# Patient Record
Sex: MALE | Race: BLACK OR AFRICAN AMERICAN | NOT HISPANIC OR LATINO | Employment: UNEMPLOYED | URBAN - NONMETROPOLITAN AREA
[De-identification: names, ages, dates, MRNs, and addresses within clinical notes are randomized per-mention and may not be internally consistent; named-entity substitution may affect disease eponyms.]

---

## 2023-06-15 ENCOUNTER — OFFICE VISIT (OUTPATIENT)
Dept: FAMILY MEDICINE | Facility: CLINIC | Age: 58
End: 2023-06-15
Payer: COMMERCIAL

## 2023-06-15 ENCOUNTER — HOSPITAL ENCOUNTER (EMERGENCY)
Facility: HOSPITAL | Age: 58
Discharge: HOME | End: 2023-06-15
Attending: EMERGENCY MEDICINE
Payer: COMMERCIAL

## 2023-06-15 VITALS
SYSTOLIC BLOOD PRESSURE: 128 MMHG | WEIGHT: 261.13 LBS | BODY MASS INDEX: 32.47 KG/M2 | OXYGEN SATURATION: 100 % | DIASTOLIC BLOOD PRESSURE: 90 MMHG | TEMPERATURE: 98 F | HEIGHT: 75 IN | HEART RATE: 72 BPM

## 2023-06-15 VITALS
SYSTOLIC BLOOD PRESSURE: 121 MMHG | DIASTOLIC BLOOD PRESSURE: 73 MMHG | HEIGHT: 75 IN | RESPIRATION RATE: 19 BRPM | WEIGHT: 260 LBS | BODY MASS INDEX: 32.33 KG/M2 | TEMPERATURE: 98 F | OXYGEN SATURATION: 99 % | HEART RATE: 65 BPM

## 2023-06-15 DIAGNOSIS — I10 HYPERTENSION, UNSPECIFIED TYPE: Primary | ICD-10-CM

## 2023-06-15 DIAGNOSIS — G51.0 BELL'S PALSY: Primary | ICD-10-CM

## 2023-06-15 DIAGNOSIS — Q67.0 FACIAL ASYMMETRY: ICD-10-CM

## 2023-06-15 LAB — GLUCOSE SERPL-MCNC: 114 MG/DL (ref 70–105)

## 2023-06-15 PROCEDURE — 3074F PR MOST RECENT SYSTOLIC BLOOD PRESSURE < 130 MM HG: ICD-10-PCS | Mod: CPTII,,, | Performed by: STUDENT IN AN ORGANIZED HEALTH CARE EDUCATION/TRAINING PROGRAM

## 2023-06-15 PROCEDURE — 3008F BODY MASS INDEX DOCD: CPT | Mod: CPTII,,, | Performed by: STUDENT IN AN ORGANIZED HEALTH CARE EDUCATION/TRAINING PROGRAM

## 2023-06-15 PROCEDURE — 3080F PR MOST RECENT DIASTOLIC BLOOD PRESSURE >= 90 MM HG: ICD-10-PCS | Mod: CPTII,,, | Performed by: STUDENT IN AN ORGANIZED HEALTH CARE EDUCATION/TRAINING PROGRAM

## 2023-06-15 PROCEDURE — 99284 EMERGENCY DEPT VISIT MOD MDM: CPT | Mod: 25

## 2023-06-15 PROCEDURE — 99204 OFFICE O/P NEW MOD 45 MIN: CPT | Mod: ,,, | Performed by: STUDENT IN AN ORGANIZED HEALTH CARE EDUCATION/TRAINING PROGRAM

## 2023-06-15 PROCEDURE — 99284 EMERGENCY DEPT VISIT MOD MDM: CPT | Mod: ,,, | Performed by: EMERGENCY MEDICINE

## 2023-06-15 PROCEDURE — 1159F MED LIST DOCD IN RCRD: CPT | Mod: CPTII,,, | Performed by: STUDENT IN AN ORGANIZED HEALTH CARE EDUCATION/TRAINING PROGRAM

## 2023-06-15 PROCEDURE — 82962 GLUCOSE BLOOD TEST: CPT

## 2023-06-15 PROCEDURE — 3008F PR BODY MASS INDEX (BMI) DOCUMENTED: ICD-10-PCS | Mod: CPTII,,, | Performed by: STUDENT IN AN ORGANIZED HEALTH CARE EDUCATION/TRAINING PROGRAM

## 2023-06-15 PROCEDURE — 3074F SYST BP LT 130 MM HG: CPT | Mod: CPTII,,, | Performed by: STUDENT IN AN ORGANIZED HEALTH CARE EDUCATION/TRAINING PROGRAM

## 2023-06-15 PROCEDURE — 3080F DIAST BP >= 90 MM HG: CPT | Mod: CPTII,,, | Performed by: STUDENT IN AN ORGANIZED HEALTH CARE EDUCATION/TRAINING PROGRAM

## 2023-06-15 PROCEDURE — 99204 PR OFFICE/OUTPT VISIT, NEW, LEVL IV, 45-59 MIN: ICD-10-PCS | Mod: ,,, | Performed by: STUDENT IN AN ORGANIZED HEALTH CARE EDUCATION/TRAINING PROGRAM

## 2023-06-15 PROCEDURE — 1159F PR MEDICATION LIST DOCUMENTED IN MEDICAL RECORD: ICD-10-PCS | Mod: CPTII,,, | Performed by: STUDENT IN AN ORGANIZED HEALTH CARE EDUCATION/TRAINING PROGRAM

## 2023-06-15 PROCEDURE — 99284 PR EMERGENCY DEPT VISIT,LEVEL IV: ICD-10-PCS | Mod: ,,, | Performed by: EMERGENCY MEDICINE

## 2023-06-15 RX ORDER — PREDNISONE 20 MG/1
40 TABLET ORAL DAILY
Qty: 10 TABLET | Refills: 0 | Status: SHIPPED | OUTPATIENT
Start: 2023-06-15 | End: 2023-06-20

## 2023-06-15 RX ORDER — TADALAFIL 5 MG/1
5 TABLET ORAL
COMMUNITY
Start: 2023-02-01 | End: 2023-08-28 | Stop reason: SDUPTHER

## 2023-06-15 RX ORDER — ACYCLOVIR 800 MG/1
800 TABLET ORAL
Qty: 40 TABLET | Refills: 0 | Status: SHIPPED | OUTPATIENT
Start: 2023-06-15 | End: 2023-07-17 | Stop reason: ALTCHOICE

## 2023-06-15 RX ORDER — AMLODIPINE BESYLATE 10 MG/1
10 TABLET ORAL DAILY
COMMUNITY
End: 2023-08-29 | Stop reason: SDUPTHER

## 2023-06-15 NOTE — PROGRESS NOTES
[facility]    Chief Complaint      Chief Complaint   Patient presents with    Establish Care     Pt came in to est care, but had to be evaluated for possible stroke measures         History of Present Illness      Val Henry is a 58 y.o. male  initially in  to clinic to establish care, however, states wants to be evaluated for possible stroke.  History of HLD, HTN, Bell's Palsy, ED.  States this particular episode is different from his normal Bell's palsy flare up has facial drooping, states blood pressure was suddenly elevated during event, slurred speech.  Took baby asa.    Referring to ED for urgent work up, as pt is adamant that this episode is very different from his normal [place] Palsy and is concerned.   Declines need for ambulance transport and states feels stable to drive.    Past Medical History:  Past Medical History:   Diagnosis Date    Arthritis     Diabetes mellitus type I     Disorder of kidney and ureter     GERD (gastroesophageal reflux disease)     Hyperlipidemia     Hypertension        Past Surgical History:   has no past surgical history on file.    Social History:  Social History     Tobacco Use    Smoking status: Never    Smokeless tobacco: Never   Substance Use Topics    Alcohol use: Not Currently    Drug use: Never       I personally reviewed all past medical, surgical, and social.     Review of Systems   Constitutional:  Negative for chills and fever.   Eyes:  Positive for blurred vision.   Respiratory:  Negative for shortness of breath.    Cardiovascular:  Negative for chest pain.   Neurological:  Positive for tingling, speech change and headaches. Negative for focal weakness, seizures and loss of consciousness.        Facial asymmetry       Medications:  Outpatient Encounter Medications as of 6/15/2023   Medication Sig Dispense Refill    amLODIPine (NORVASC) 10 MG tablet Take 10 mg by mouth once daily.      tadalafiL (CIALIS) 5 MG tablet Take 5 mg by mouth.       No  "facility-administered encounter medications on file as of 6/15/2023.       Allergies:  Review of patient's allergies indicates:  No Known Allergies    Health Maintenance:    There is no immunization history on file for this patient.   Health Maintenance   Topic Date Due    Hepatitis C Screening  Never done    TETANUS VACCINE  Never done    Lipid Panel  01/31/2027        Physical Exam      Vital Signs  Temp: 98.1 °F (36.7 °C)  Temp Source: Oral  Pulse: 72  SpO2: 100 %  BP: (!) 128/90  BP Location: Left arm  Patient Position: Sitting  Height and Weight  Height: 6' 3" (190.5 cm)  Weight: 118.4 kg (261 lb 1.6 oz)  BSA (Calculated - sq m): 2.5 sq meters  BMI (Calculated): 32.6  Weight in (lb) to have BMI = 25: 199.6]    Physical Exam  Constitutional:       Appearance: Normal appearance.   Cardiovascular:      Rate and Rhythm: Normal rate.   Pulmonary:      Effort: Pulmonary effort is normal.      Breath sounds: Normal breath sounds.   Skin:     General: Skin is warm and dry.   Neurological:      Mental Status: He is alert and oriented to person, place, and time.      Motor: No weakness.      Gait: Gait normal.        Laboratory:  CBC:      CMP:        Invalid input(s): CREATININ  LIPIDS:      TSH:      A1C:        Assessment/Plan     Val Henry is a 58 y.o.male with:    1. Hypertension, unspecified type  -continue current medications  2. Facial asymmetry   -likely due to Bell's Palsy, however, pt requesting to be evaluated for stroke and was referred to ED     Chronic conditions status updated as per HPI.  Other than changes above, cont current medications and maintain follow up with specialists.  Return to clinic in as needed/scheduled.     There are no Patient Instructions on file for this visit.     Mague Palacios, [facility]                  "

## 2023-06-15 NOTE — DISCHARGE INSTRUCTIONS
Follow-up with primary care and with neurologist.  In ambulatory referral was made to neurologist    Make sure to take your medications regularly.  Also resume your medication for cholesterol.    Your primary care doctor may decide to order more tests such as a more recent lipid panel.  Also talk with neurologist about any other needed test    Take aspirin 81 mg daily    Will start prednisone for Bell's palsy.  Adhere to diabetic diet.

## 2023-06-15 NOTE — ED PROVIDER NOTES
Encounter Date: 6/15/2023    SCRIBE #1 NOTE: I, Raissa Damico, am scribing for, and in the presence of,  Gio Lira MD. I have scribed the entire note.     History     Chief Complaint   Patient presents with    Facial Droop     Left side. Hx bells palsy    Numbness     Left sided facial numbness x 2 days     This is a 59 y/o black male,who presents to the ED for evaluation. He states over the past 2 days, he has noticed left sided facial droop. He reports he has had Bell's Palsy in the past. He notes he started to feel numbness in the left sided fingertips. He reports slight slurred speech as well as well slight blurred vision.Pt reports cold like Sx associated with the facial droop.  There are no other complaints/pain in the ED at this time. He has a known hx of diabetes, GERD, HTN, and hyperlipidemia. There is no family medical hx of stroke to his knowledge. There is no smoking Hx.     The history is provided by the patient. No  was used.   Review of patient's allergies indicates:  No Known Allergies  Past Medical History:   Diagnosis Date    Arthritis     Diabetes mellitus type I     Disorder of kidney and ureter     GERD (gastroesophageal reflux disease)     Hyperlipidemia     Hypertension      History reviewed. No pertinent surgical history.  History reviewed. No pertinent family history.  Social History     Tobacco Use    Smoking status: Never    Smokeless tobacco: Never   Substance Use Topics    Alcohol use: Not Currently    Drug use: Never     Review of Systems   Eyes:  Positive for visual disturbance.   Neurological:  Positive for speech difficulty, weakness and numbness.   All other systems reviewed and are negative.    Physical Exam     Initial Vitals [06/15/23 0955]   BP Pulse Resp Temp SpO2   135/82 91 17 98.4 °F (36.9 °C) 97 %      MAP       --         Physical Exam    Nursing note and vitals reviewed.  Constitutional: He appears well-developed and well-nourished.    HENT:   Head: Normocephalic and atraumatic.   Eyes: EOM are normal. Pupils are equal, round, and reactive to light.   Neck: Neck supple. No thyromegaly present. No JVD present.   Normal range of motion.  Cardiovascular:  Normal rate, regular rhythm, normal heart sounds and intact distal pulses.           No murmur heard.  Pulmonary/Chest: Breath sounds normal. No stridor. No respiratory distress. He has no wheezes.   Abdominal: Abdomen is soft. Bowel sounds are normal. He exhibits no distension. There is no abdominal tenderness.   Musculoskeletal:         General: No tenderness or edema. Normal range of motion.      Cervical back: Normal range of motion and neck supple.     Lymphadenopathy:     He has no cervical adenopathy.   Neurological: He is alert and oriented to person, place, and time. He has normal strength. No cranial nerve deficit or sensory deficit. GCS score is 15. GCS eye subscore is 4. GCS verbal subscore is 5. GCS motor subscore is 6.   Pt has slight upper and lower facial droop.      Skin: Skin is warm and dry. Capillary refill takes less than 2 seconds. No rash noted.   Psychiatric: He has a normal mood and affect.       ED Course   Procedures  Labs Reviewed   POCT GLUCOSE MONITORING CONTINUOUS - Abnormal; Notable for the following components:       Result Value    POC Glucose 114 (*)     All other components within normal limits          Imaging Results              CT Head Without Contrast (Final result)  Result time 06/15/23 10:44:36      Final result by Rosalio Gutierrez MD (06/15/23 10:44:36)                   Impression:      No acute intracranial abnormality.      Electronically signed by: Rosalio Gutierrez  Date:    06/15/2023  Time:    10:44               Narrative:    EXAMINATION:  CT HEAD WITHOUT CONTRAST    CLINICAL HISTORY:  Neuro deficit, acute, stroke suspected;    TECHNIQUE:  CT of the head performed without the use of intravenous contrast.  The CT examination was performed using one or more  of the following dose reduction techniques: Automated exposure control, adjustment of the mA and kV according to patient's size, use of acute or iterative reconstruction techniques.    COMPARISON:  None.    FINDINGS:  No acute intracranial hemorrhage, mass, infarct, or fluid collection.  Ventricles, sulci, and cisterns appear normal.  No mass effect or midline shift.  Calvarium intact.  Mastoid air cells and paranasal sinuses clear.                                    X-Rays:   Independently Interpreted Readings:   Head CT: CT Head without contrast:   No acute intracranial abnormality.   Medications - No data to display  Medical Decision Making:   ED Management:  MDM    Patient presents for emergent evaluation of acute left face weakness that poses a threat to life and/or bodily function.    In the ED patient found to have acute Bell's palsy.    I ordered CT scan and personally reviewed it and reviewed the radiologist interpretation.  CT significant for no acute abnormality.      Discharge MDM  Do not suspect acute stroke limited to motor of the face.  No hand or leg weakness.  NIH stroke score only positive for face involvement.  Upper and lower face involved.  Will treat with acyclovir and prednisone.  Will have him follow-up with Neurology and primary care  The response to treatment was stable.    Patient was discharged in stable condition.  Detailed return precautions discussed.   Additional MDM:     NIH Stroke Scale:   Level of consciousness = 0 - alert  LOC questions = 0 - answers both correctly  LOC commands = 0 - performs both correctly  Best gaze = 0 - normal  Visual = 0 - no visual loss  Facial palsy = 1 - minor  Motor left arm =  0 - no drift  Motor right arm =  0 - no drift  Motor left leg = 0 - no drift  Motor right leg =  0 - no drift  Limb ataxia = 0 - absent  Sensory = 0 - normal  Best language = 0 - no aphasia  Dysarthria = 0 - normal articulation  Extinction and inattention = 0 - no neglect  NIH  Stroke Scale Total = 1       Attending Attestation:           Physician Attestation for Scribe:  Physician Attestation Statement for Scribe #1: I, Gio Lira MD, reviewed documentation, as scribed by Raissa Damico in my presence, and it is both accurate and complete.           ED Course as of 06/15/23 1316   Thu Otis 15, 2023   1029 Consistent with recurrent Bell's palsy.  No arm or leg involvement.  NIH stroke score is completely normal except for some upper and lower face droop on the left.  He is also had recent URI.  Patient has had diabetes and dyslipidemia.  Elected not to have blood work done today but to defer to outpatient/primary care.  Will have ambulatory referral to Neurology.  Advised him to start taking his cholesterol medication.  Has done well prednisone previously with Bell's palsy. [PK]   1048 CT Head without contrast:   No acute intracranial abnormality. [BW]      ED Course User Index  [BW] Raissa Damico  [PK] Gio Lira MD                   Clinical Impression:   Final diagnoses:  [G51.0] Bell's palsy (Primary)        ED Disposition Condition    Discharge Stable          ED Prescriptions       Medication Sig Dispense Start Date End Date Auth. Provider    predniSONE (DELTASONE) 20 MG tablet Take 2 tablets (40 mg total) by mouth once daily. for 5 days 10 tablet 6/15/2023 6/20/2023 Gio Lira MD    acyclovir (ZOVIRAX) 800 MG Tab Take 1 tablet (800 mg total) by mouth 5 (five) times daily. 40 tablet 6/15/2023 6/14/2024 Gio Lira MD          Follow-up Information    None          Gio Lira MD  06/15/23 1199

## 2023-06-17 ENCOUNTER — TELEPHONE (OUTPATIENT)
Dept: EMERGENCY MEDICINE | Facility: HOSPITAL | Age: 58
End: 2023-06-17
Payer: COMMERCIAL

## 2023-07-17 ENCOUNTER — OFFICE VISIT (OUTPATIENT)
Dept: FAMILY MEDICINE | Facility: CLINIC | Age: 58
End: 2023-07-17
Payer: COMMERCIAL

## 2023-07-17 VITALS
SYSTOLIC BLOOD PRESSURE: 148 MMHG | BODY MASS INDEX: 32.6 KG/M2 | DIASTOLIC BLOOD PRESSURE: 82 MMHG | HEART RATE: 85 BPM | HEIGHT: 75 IN | TEMPERATURE: 98 F | WEIGHT: 262.19 LBS | OXYGEN SATURATION: 97 %

## 2023-07-17 DIAGNOSIS — I10 HYPERTENSION, UNSPECIFIED TYPE: Primary | ICD-10-CM

## 2023-07-17 DIAGNOSIS — Z12.11 SCREENING FOR COLON CANCER: Primary | ICD-10-CM

## 2023-07-17 DIAGNOSIS — D22.9 BENIGN MOLE: ICD-10-CM

## 2023-07-17 DIAGNOSIS — R05.9 COUGH, UNSPECIFIED TYPE: ICD-10-CM

## 2023-07-17 DIAGNOSIS — Z12.5 SCREENING FOR MALIGNANT NEOPLASM OF PROSTATE: ICD-10-CM

## 2023-07-17 DIAGNOSIS — Z13.220 SCREENING FOR LIPID DISORDERS: ICD-10-CM

## 2023-07-17 DIAGNOSIS — Z13.1 SCREENING FOR DIABETES MELLITUS: ICD-10-CM

## 2023-07-17 DIAGNOSIS — M25.552 PAIN OF LEFT HIP: ICD-10-CM

## 2023-07-17 DIAGNOSIS — Z13.31 POSITIVE DEPRESSION SCREENING: ICD-10-CM

## 2023-07-17 DIAGNOSIS — D17.9 LIPOMA, UNSPECIFIED SITE: ICD-10-CM

## 2023-07-17 LAB
ALBUMIN SERPL BCP-MCNC: 3.5 G/DL (ref 3.5–5)
ALBUMIN/GLOB SERPL: 1.2 {RATIO}
ALP SERPL-CCNC: 120 U/L (ref 45–115)
ALT SERPL W P-5'-P-CCNC: 64 U/L (ref 16–61)
ANION GAP SERPL CALCULATED.3IONS-SCNC: 10 MMOL/L (ref 7–16)
AST SERPL W P-5'-P-CCNC: 29 U/L (ref 15–37)
BASOPHILS # BLD AUTO: 0.06 K/UL (ref 0–0.2)
BASOPHILS NFR BLD AUTO: 1 % (ref 0–1)
BILIRUB SERPL-MCNC: 0.3 MG/DL (ref ?–1.2)
BUN SERPL-MCNC: 15 MG/DL (ref 7–18)
BUN/CREAT SERPL: 13 (ref 6–20)
CALCIUM SERPL-MCNC: 8.8 MG/DL (ref 8.5–10.1)
CHLORIDE SERPL-SCNC: 109 MMOL/L (ref 98–107)
CHOLEST SERPL-MCNC: 196 MG/DL (ref 0–200)
CHOLEST/HDLC SERPL: 6.1 {RATIO}
CO2 SERPL-SCNC: 26 MMOL/L (ref 21–32)
CREAT SERPL-MCNC: 1.16 MG/DL (ref 0.7–1.3)
DIFFERENTIAL METHOD BLD: ABNORMAL
EGFR (NO RACE VARIABLE) (RUSH/TITUS): 73 ML/MIN/1.73M2
EOSINOPHIL # BLD AUTO: 0.33 K/UL (ref 0–0.5)
EOSINOPHIL NFR BLD AUTO: 5.5 % (ref 1–4)
ERYTHROCYTE [DISTWIDTH] IN BLOOD BY AUTOMATED COUNT: 13.8 % (ref 11.5–14.5)
EST. AVERAGE GLUCOSE BLD GHB EST-MCNC: 117 MG/DL
GLOBULIN SER-MCNC: 2.9 G/DL (ref 2–4)
GLUCOSE SERPL-MCNC: 134 MG/DL (ref 74–106)
HBA1C MFR BLD HPLC: 6.1 % (ref 4.5–6.6)
HCT VFR BLD AUTO: 44.3 % (ref 40–54)
HDLC SERPL-MCNC: 32 MG/DL (ref 40–60)
HGB BLD-MCNC: 14.9 G/DL (ref 13.5–18)
IMM GRANULOCYTES # BLD AUTO: 0.01 K/UL (ref 0–0.04)
IMM GRANULOCYTES NFR BLD: 0.2 % (ref 0–0.4)
LDLC SERPL CALC-MCNC: 122 MG/DL
LDLC/HDLC SERPL: 3.8 {RATIO}
LYMPHOCYTES # BLD AUTO: 1.81 K/UL (ref 1–4.8)
LYMPHOCYTES NFR BLD AUTO: 30.1 % (ref 27–41)
MCH RBC QN AUTO: 26.4 PG (ref 27–31)
MCHC RBC AUTO-ENTMCNC: 33.6 G/DL (ref 32–36)
MCV RBC AUTO: 78.4 FL (ref 80–96)
MONOCYTES # BLD AUTO: 0.63 K/UL (ref 0–0.8)
MONOCYTES NFR BLD AUTO: 10.5 % (ref 2–6)
MPC BLD CALC-MCNC: 9.8 FL (ref 9.4–12.4)
NEUTROPHILS # BLD AUTO: 3.17 K/UL (ref 1.8–7.7)
NEUTROPHILS NFR BLD AUTO: 52.7 % (ref 53–65)
NONHDLC SERPL-MCNC: 164 MG/DL
NRBC # BLD AUTO: 0 X10E3/UL
NRBC, AUTO (.00): 0 %
PLATELET # BLD AUTO: 290 K/UL (ref 150–400)
POTASSIUM SERPL-SCNC: 4 MMOL/L (ref 3.5–5.1)
PROT SERPL-MCNC: 6.4 G/DL (ref 6.4–8.2)
PSA SERPL-MCNC: 4.79 NG/ML
RBC # BLD AUTO: 5.65 M/UL (ref 4.6–6.2)
SODIUM SERPL-SCNC: 141 MMOL/L (ref 136–145)
TRIGL SERPL-MCNC: 211 MG/DL (ref 35–150)
VLDLC SERPL-MCNC: 42 MG/DL
WBC # BLD AUTO: 6.01 K/UL (ref 4.5–11)

## 2023-07-17 PROCEDURE — 3079F PR MOST RECENT DIASTOLIC BLOOD PRESSURE 80-89 MM HG: ICD-10-PCS | Mod: CPTII,,, | Performed by: STUDENT IN AN ORGANIZED HEALTH CARE EDUCATION/TRAINING PROGRAM

## 2023-07-17 PROCEDURE — 85025 CBC WITH DIFFERENTIAL: ICD-10-PCS | Mod: ,,, | Performed by: CLINICAL MEDICAL LABORATORY

## 2023-07-17 PROCEDURE — 83036 HEMOGLOBIN GLYCOSYLATED A1C: CPT | Mod: ,,, | Performed by: CLINICAL MEDICAL LABORATORY

## 2023-07-17 PROCEDURE — G0103 PSA, SCREENING: ICD-10-PCS | Mod: ,,, | Performed by: CLINICAL MEDICAL LABORATORY

## 2023-07-17 PROCEDURE — 85025 COMPLETE CBC W/AUTO DIFF WBC: CPT | Mod: ,,, | Performed by: CLINICAL MEDICAL LABORATORY

## 2023-07-17 PROCEDURE — G0103 PSA SCREENING: HCPCS | Mod: ,,, | Performed by: CLINICAL MEDICAL LABORATORY

## 2023-07-17 PROCEDURE — 80061 LIPID PANEL: CPT | Mod: ,,, | Performed by: CLINICAL MEDICAL LABORATORY

## 2023-07-17 PROCEDURE — 99214 PR OFFICE/OUTPT VISIT, EST, LEVL IV, 30-39 MIN: ICD-10-PCS | Mod: ,,, | Performed by: STUDENT IN AN ORGANIZED HEALTH CARE EDUCATION/TRAINING PROGRAM

## 2023-07-17 PROCEDURE — 3077F PR MOST RECENT SYSTOLIC BLOOD PRESSURE >= 140 MM HG: ICD-10-PCS | Mod: CPTII,,, | Performed by: STUDENT IN AN ORGANIZED HEALTH CARE EDUCATION/TRAINING PROGRAM

## 2023-07-17 PROCEDURE — 99214 OFFICE O/P EST MOD 30 MIN: CPT | Mod: ,,, | Performed by: STUDENT IN AN ORGANIZED HEALTH CARE EDUCATION/TRAINING PROGRAM

## 2023-07-17 PROCEDURE — 80053 COMPREHEN METABOLIC PANEL: CPT | Mod: ,,, | Performed by: CLINICAL MEDICAL LABORATORY

## 2023-07-17 PROCEDURE — 3008F PR BODY MASS INDEX (BMI) DOCUMENTED: ICD-10-PCS | Mod: CPTII,,, | Performed by: STUDENT IN AN ORGANIZED HEALTH CARE EDUCATION/TRAINING PROGRAM

## 2023-07-17 PROCEDURE — 3079F DIAST BP 80-89 MM HG: CPT | Mod: CPTII,,, | Performed by: STUDENT IN AN ORGANIZED HEALTH CARE EDUCATION/TRAINING PROGRAM

## 2023-07-17 PROCEDURE — 83036 HEMOGLOBIN A1C: ICD-10-PCS | Mod: ,,, | Performed by: CLINICAL MEDICAL LABORATORY

## 2023-07-17 PROCEDURE — 80061 LIPID PANEL: ICD-10-PCS | Mod: ,,, | Performed by: CLINICAL MEDICAL LABORATORY

## 2023-07-17 PROCEDURE — 3077F SYST BP >= 140 MM HG: CPT | Mod: CPTII,,, | Performed by: STUDENT IN AN ORGANIZED HEALTH CARE EDUCATION/TRAINING PROGRAM

## 2023-07-17 PROCEDURE — 1159F PR MEDICATION LIST DOCUMENTED IN MEDICAL RECORD: ICD-10-PCS | Mod: CPTII,,, | Performed by: STUDENT IN AN ORGANIZED HEALTH CARE EDUCATION/TRAINING PROGRAM

## 2023-07-17 PROCEDURE — 80053 COMPREHENSIVE METABOLIC PANEL: ICD-10-PCS | Mod: ,,, | Performed by: CLINICAL MEDICAL LABORATORY

## 2023-07-17 PROCEDURE — 3008F BODY MASS INDEX DOCD: CPT | Mod: CPTII,,, | Performed by: STUDENT IN AN ORGANIZED HEALTH CARE EDUCATION/TRAINING PROGRAM

## 2023-07-17 PROCEDURE — 1159F MED LIST DOCD IN RCRD: CPT | Mod: CPTII,,, | Performed by: STUDENT IN AN ORGANIZED HEALTH CARE EDUCATION/TRAINING PROGRAM

## 2023-07-17 RX ORDER — HYDROCHLOROTHIAZIDE 12.5 MG/1
12.5 TABLET ORAL DAILY
Qty: 30 TABLET | Refills: 0 | Status: SHIPPED | OUTPATIENT
Start: 2023-07-17 | End: 2023-08-29 | Stop reason: SDUPTHER

## 2023-07-17 RX ORDER — POLYETHYLENE GLYCOL 3350, SODIUM SULFATE ANHYDROUS, SODIUM BICARBONATE, SODIUM CHLORIDE, POTASSIUM CHLORIDE 236; 22.74; 6.74; 5.86; 2.97 G/4L; G/4L; G/4L; G/4L; G/4L
4 POWDER, FOR SOLUTION ORAL ONCE
Qty: 4000 ML | Refills: 0 | Status: SHIPPED | OUTPATIENT
Start: 2023-07-17 | End: 2023-07-17

## 2023-07-17 NOTE — PATIENT INSTRUCTIONS
Check blood pressure daily and record.  Follow dash diet.  Increase physical activity to 30-60 minutes at least 3 times a week.     Follow up in one month.    We will contact you with your imaging and lab results once received.     If you do not hear back regarding your referrals in the next 1-2 weeks please follow up with us.

## 2023-07-17 NOTE — PROGRESS NOTES
[facility]    Chief Complaint      Chief Complaint   Patient presents with    Wellness Visit     Pt is here for his wellness visit       History of Present Illness      Val Henry is a 58 y.o. male with chronic conditions of HLD, Hypertension, DM, Gerd, Arthritis, Bell's Palsy who presents today for wellness visit but has several concerns that he wants addressed today. Voiced that he was once treated for ADHD which also helped with weight loss and is concerned about his short term memory.  Has appt pending with neurology to address his Bell's Palsy and will discuss memory concerns at that appointment.  C/o chronic intermittent left hip pain and would like imaging today.   [facility] has had a cough for about 8 years, was treated for exposure to TB 8 years ago.  Cough is intermittent, dry, hacky.  Would like imaging today to further evaluate this.  Admits to having depression, does not want to start any medications but willing to have consult to Psych for further eval.  Agreeable to having a colonoscope ordered.  [facility] Hep C labs were drawn while he was prion, has negative HIV lab on file from 2022.  [facility] was told that he was diabetic in nursing home but since being out has been told that he's pre-diabetic.  Will get A1c today.  Last eye exam a year ago, instructed to schedule one for his annual eye exam asap.   Blood pressure is elevated today, states took medications as prescribed.     Past Medical History:  Past Medical History:   Diagnosis Date    Arthritis     Bell's palsy     Diabetes mellitus, type 2     Disorder of kidney and ureter     GERD (gastroesophageal reflux disease)     Hyperlipidemia     Hypertension        Past Surgical History:   has no past surgical history on file.    Social History:  Social History     Tobacco Use    Smoking status: Never     Passive exposure: Never    Smokeless tobacco: Never   Substance Use Topics    Alcohol use: Not Currently    Drug use: Never       I personally  reviewed all past medical, surgical, and social.     Review of Systems   Constitutional:  Negative for fatigue and fever.   HENT:  Negative for nasal congestion, postnasal drip and sore throat.    Respiratory:  Negative for shortness of breath.    Cardiovascular:  Negative for chest pain.   Gastrointestinal:  Negative for abdominal pain.   Genitourinary:  Negative for dysuria.   Musculoskeletal:  Positive for arthralgias (Left hip). Negative for back pain.   Integumentary:  Positive for mole/lesion. Negative for rash.        Mass upper left thoracic area    Neurological:  Positive for memory loss. Negative for tremors, syncope, light-headedness and headaches.   Psychiatric/Behavioral:  Negative for agitation, behavioral problems, dysphoric mood, hallucinations, self-injury and suicidal ideas.    All other systems reviewed and are negative.     Medications:  Outpatient Encounter Medications as of 2023   Medication Sig Dispense Refill    amLODIPine (NORVASC) 10 MG tablet Take 10 mg by mouth once daily.      tadalafiL (CIALIS) 5 MG tablet Take 5 mg by mouth.      acyclovir (ZOVIRAX) 800 MG Tab Take 1 tablet (800 mg total) by mouth 5 (five) times daily. (Patient not taking: Reported on 2023) 40 tablet 0    hydroCHLOROthiazide (HYDRODIURIL) 12.5 MG Tab Take 1 tablet (12.5 mg total) by mouth once daily. 30 tablet 0    [] predniSONE (DELTASONE) 20 MG tablet Take 2 tablets (40 mg total) by mouth once daily. for 5 days 10 tablet 0     No facility-administered encounter medications on file as of 2023.       Allergies:  Review of patient's allergies indicates:  No Known Allergies    Health Maintenance:    There is no immunization history on file for this patient.   Health Maintenance   Topic Date Due    Hepatitis C Screening  Never done    TETANUS VACCINE  Never done    Lipid Panel  2027        Physical Exam      Vital Signs  Temp: 98.1 °F (36.7 °C)  Temp Source: Oral  Pulse: 85  SpO2: 97 %  BP: (!)  "148/82  BP Location: Left arm  Patient Position: Sitting  Height and Weight  Height: 6' 3" (190.5 cm)  Weight: 118.9 kg (262 lb 3.2 oz)  BSA (Calculated - sq m): 2.51 sq meters  BMI (Calculated): 32.8  Weight in (lb) to have BMI = 25: 199.6]    Physical Exam  Constitutional:       Appearance: He is obese.   HENT:      Right Ear: External ear normal.      Left Ear: External ear normal.   Eyes:      Conjunctiva/sclera: Conjunctivae normal.   Neck:      Vascular: No carotid bruit.   Cardiovascular:      Rate and Rhythm: Normal rate and regular rhythm.      Heart sounds: Normal heart sounds.   Pulmonary:      Effort: Pulmonary effort is normal.      Breath sounds: Normal breath sounds.   Abdominal:      General: Bowel sounds are normal.      Palpations: Abdomen is soft.   Musculoskeletal:         General: No swelling, tenderness, deformity or signs of injury. Normal range of motion.      Cervical back: Normal range of motion and neck supple.        Back:       Right lower leg: No edema.      Left lower leg: No edema.   Skin:     Findings: Lesion present.   Neurological:      General: No focal deficit present.      Mental Status: He is alert and oriented to person, place, and time. Mental status is at baseline.      Gait: Gait normal.   Psychiatric:         Mood and Affect: Mood normal.         Behavior: Behavior normal.         Thought Content: Thought content normal.         Judgment: Judgment normal.        Laboratory:  CBC:      CMP:        Invalid input(s): CREATININ  LIPIDS:      TSH:      A1C:        Assessment/Plan     Val Henry is a 58 y.o.male with:    1. Hypertension, unspecified type  -     CBC Auto Differential; Future; Expected date: 07/17/2023  -     Comprehensive Metabolic Panel; Future; Expected date: 07/17/2023  -     hydroCHLOROthiazide (HYDRODIURIL) 12.5 MG Tab; Take 1 tablet (12.5 mg total) by mouth once daily.  Dispense: 30 tablet; Refill: 0  - return in one month  2. Cough, unspecified type  - "     X-Ray Chest PA And Lateral; Future; Expected date: 07/17/2023    3. Lipoma, unspecified site  -     US Soft Tissue Chest_Upper Back; Future; Expected date: 07/17/2023    4. Positive depression screening  Comments:  I have reviewed the positive depression score which warrants active treatment with psychotherapy and/or medications.  Orders:  -     Ambulatory referral/consult to Psychiatry; Future; Expected date: 07/24/2023    5. Benign mole  -     Ambulatory referral/consult to Dermatology; Future; Expected date: 07/24/2023    6. Pain of left hip  -     Cancel: X-Ray Hip 1 View Left (with Pelvis when performed); Future; Expected date: 07/17/2023  -     X-Ray Hip 2 or 3 views Left (with Pelvis when performed); Future; Expected date: 07/17/2023    7. Screening for lipid disorders  -     Lipid Panel; Future; Expected date: 07/17/2023    8. Screening for diabetes mellitus  -     Hemoglobin A1C; Future; Expected date: 07/17/2023    9. Screening for malignant neoplasm of prostate  -     PSA, Screening; Future; Expected date: 07/17/2023    10. Encounter for screening colonoscopy  -     Colonoscopy; Future; Expected date: 07/17/2023  - order canceled, records located indicating that pt has had a colonoscope in 2022 with repeat due in 5-10 years       Chronic conditions status updated as per HPI.  Other than changes above, cont current medications and maintain follow up with specialists.  Return to clinic in 1 months.    Patient Instructions   Check blood pressure daily and record.  Follow dash diet.  Increase physical activity to 30-60 minutes at least 3 times a week.     Follow up in one month.    We will contact you with your imaging and lab results once received.     If you do not hear back regarding your referrals in the next 1-2 weeks please follow up with us.      Mague Palacios, FNP-BC  [facility]

## 2023-07-18 ENCOUNTER — TELEPHONE (OUTPATIENT)
Dept: FAMILY MEDICINE | Facility: CLINIC | Age: 58
End: 2023-07-18
Payer: COMMERCIAL

## 2023-07-18 DIAGNOSIS — R97.20 ELEVATED PSA: Primary | ICD-10-CM

## 2023-07-18 NOTE — TELEPHONE ENCOUNTER
Spoke w/pt regarding labs..let hime know triglycerides were elevated, for now she wants him to try & control it w/heart healthy/low cholesterol diet/exercise. Also his psa is elevated so he needs to f/u his urologist. His A1C indicates pre-diabetes, he can either be started on medication or continue to control it w/diet/exercise. Pt stated that he would try diet & exercise for now, if there's no improvement when comes in, he'll try medication. I also let pt know that we canceled his colonoscopy appt since he had one in 2022 & it's recommended that he another one done in 5-10yrs. Pt verbalized his understanding & asked if he could have a urology appt here in [place] instead in [place]. Let pt know I would see if we could get him one scheduled.

## 2023-07-18 NOTE — TELEPHONE ENCOUNTER
----- Message from LAURE Sanchez sent at 7/17/2023  5:09 PM CDT -----  Your cxr was normal and your left hip x ray showed mild degenerative changes (wear and tear).  You can take otc motrin as needed for pain.

## 2023-07-18 NOTE — TELEPHONE ENCOUNTER
----- Message from LAURE Sanchez sent at 7/17/2023  5:08 PM CDT -----  Please let pt know that triglycerides were elevated for now I want to try and control with a heart healthy/low cholesterol diet and exercise.  Your psa was slightly elevated, please follow up with your urologist.  Your A1C indicated pre-diabetes, we can start a medication or you can continue to control with diet and exercise and we can recheck this in a few months please let me know if you are agreeable to starting medication or if you'd prefer to control with diet and exercise..  We canceled your Colonoscopy because we saw records indicating that you had this back in 2022 and was advised to repeat in 5-10 years.

## 2023-07-26 ENCOUNTER — HOSPITAL ENCOUNTER (OUTPATIENT)
Dept: RADIOLOGY | Facility: HOSPITAL | Age: 58
Discharge: HOME | End: 2023-07-26
Attending: STUDENT IN AN ORGANIZED HEALTH CARE EDUCATION/TRAINING PROGRAM
Payer: COMMERCIAL

## 2023-07-26 DIAGNOSIS — D17.9 LIPOMA, UNSPECIFIED SITE: ICD-10-CM

## 2023-07-26 PROCEDURE — 76604 US EXAM CHEST: CPT | Mod: TC

## 2023-07-26 PROCEDURE — 76604 US EXAM CHEST: CPT | Mod: 26,,, | Performed by: RADIOLOGY

## 2023-07-26 PROCEDURE — 76604 US SOFT TISSUE CHEST_UPPER BACK: ICD-10-PCS | Mod: 26,,, | Performed by: RADIOLOGY

## 2023-07-26 RX ORDER — POLYETHYLENE GLYCOL-3350 AND ELECTROLYTES 236; 6.74; 5.86; 2.97; 22.74 G/274.31G; G/274.31G; G/274.31G; G/274.31G; G/274.31G
POWDER, FOR SOLUTION ORAL
COMMUNITY
Start: 2023-07-17

## 2023-07-27 ENCOUNTER — OFFICE VISIT (OUTPATIENT)
Dept: NEUROLOGY | Facility: CLINIC | Age: 58
End: 2023-07-27
Payer: COMMERCIAL

## 2023-07-27 VITALS
OXYGEN SATURATION: 98 % | WEIGHT: 262.31 LBS | SYSTOLIC BLOOD PRESSURE: 129 MMHG | HEIGHT: 75 IN | HEART RATE: 71 BPM | BODY MASS INDEX: 32.61 KG/M2 | RESPIRATION RATE: 18 BRPM | DIASTOLIC BLOOD PRESSURE: 82 MMHG

## 2023-07-27 DIAGNOSIS — N52.9 ERECTILE DYSFUNCTION, UNSPECIFIED ERECTILE DYSFUNCTION TYPE: ICD-10-CM

## 2023-07-27 DIAGNOSIS — G51.0 BELL'S PALSY: Primary | ICD-10-CM

## 2023-07-27 PROCEDURE — 99215 OFFICE O/P EST HI 40 MIN: CPT | Mod: PBBFAC | Performed by: PSYCHIATRY & NEUROLOGY

## 2023-07-27 PROCEDURE — 1159F MED LIST DOCD IN RCRD: CPT | Mod: CPTII,,, | Performed by: PSYCHIATRY & NEUROLOGY

## 2023-07-27 PROCEDURE — 3044F PR MOST RECENT HEMOGLOBIN A1C LEVEL <7.0%: ICD-10-PCS | Mod: CPTII,,, | Performed by: PSYCHIATRY & NEUROLOGY

## 2023-07-27 PROCEDURE — 3008F PR BODY MASS INDEX (BMI) DOCUMENTED: ICD-10-PCS | Mod: CPTII,,, | Performed by: PSYCHIATRY & NEUROLOGY

## 2023-07-27 PROCEDURE — 3074F SYST BP LT 130 MM HG: CPT | Mod: CPTII,,, | Performed by: PSYCHIATRY & NEUROLOGY

## 2023-07-27 PROCEDURE — 3074F PR MOST RECENT SYSTOLIC BLOOD PRESSURE < 130 MM HG: ICD-10-PCS | Mod: CPTII,,, | Performed by: PSYCHIATRY & NEUROLOGY

## 2023-07-27 PROCEDURE — 3079F DIAST BP 80-89 MM HG: CPT | Mod: CPTII,,, | Performed by: PSYCHIATRY & NEUROLOGY

## 2023-07-27 PROCEDURE — 1159F PR MEDICATION LIST DOCUMENTED IN MEDICAL RECORD: ICD-10-PCS | Mod: CPTII,,, | Performed by: PSYCHIATRY & NEUROLOGY

## 2023-07-27 PROCEDURE — 99204 PR OFFICE/OUTPT VISIT, NEW, LEVL IV, 45-59 MIN: ICD-10-PCS | Mod: S$PBB,,, | Performed by: PSYCHIATRY & NEUROLOGY

## 2023-07-27 PROCEDURE — 3044F HG A1C LEVEL LT 7.0%: CPT | Mod: CPTII,,, | Performed by: PSYCHIATRY & NEUROLOGY

## 2023-07-27 PROCEDURE — 99204 OFFICE O/P NEW MOD 45 MIN: CPT | Mod: S$PBB,,, | Performed by: PSYCHIATRY & NEUROLOGY

## 2023-07-27 PROCEDURE — 3008F BODY MASS INDEX DOCD: CPT | Mod: CPTII,,, | Performed by: PSYCHIATRY & NEUROLOGY

## 2023-07-27 PROCEDURE — 3079F PR MOST RECENT DIASTOLIC BLOOD PRESSURE 80-89 MM HG: ICD-10-PCS | Mod: CPTII,,, | Performed by: PSYCHIATRY & NEUROLOGY

## 2023-07-27 NOTE — PROGRESS NOTES
Subjective:       Patient ID: Val Henry is a 58 y.o. male     Chief Complaint:    Chief Complaint   Patient presents with    [place] Palsy        Allergies:  Patient has no known allergies.    Current Medications:    Outpatient Encounter Medications as of 2023   Medication Sig Dispense Refill    amLODIPine (NORVASC) 10 MG tablet Take 10 mg by mouth once daily.      GAVILYTE-G 236-22.74-6.74 -5.86 gram suspension SMARTSIG:Milliliter(s) By Mouth      hydroCHLOROthiazide (HYDRODIURIL) 12.5 MG Tab Take 1 tablet (12.5 mg total) by mouth once daily. 30 tablet 0    tadalafiL (CIALIS) 5 MG tablet Take 5 mg by mouth.      [] polyethylene glycol (GOLYTELY) 236-22.74-6.74 -5.86 gram suspension Take 4,000 mLs (4 L total) by mouth once. for 1 dose 4000 mL 0    [DISCONTINUED] acyclovir (ZOVIRAX) 800 MG Tab Take 1 tablet (800 mg total) by mouth 5 (five) times daily. (Patient not taking: Reported on 2023) 40 tablet 0     No facility-administered encounter medications on file as of 2023.       History of Present Illness  58-year-old black male referred from ER for 3rd reoccurrence of left Bell's palsy 2 weeks ago -placed on prednisone and acyclovirfor 5 days   Symptoms have improved about 95% but some mild L facial numbness        Past Medical History:   Diagnosis Date    Arthritis     Bell's palsy     Diabetes mellitus, type 2     Disorder of kidney and ureter     GERD (gastroesophageal reflux disease)     Hyperlipidemia     Hypertension        History reviewed. No pertinent surgical history.    Social History  Mr. Henry  reports that he has never smoked. He has never been exposed to tobacco smoke. He has never used smokeless tobacco. He reports that he does not currently use alcohol. He reports that he does not use drugs.    Family History  Mr.'s Henry family history is not on file.    Review of Systems  Review of Systems   Neurological:  Positive for focal weakness.   All other systems reviewed  "and are negative.   Objective:   /82 (BP Location: Left arm, Patient Position: Sitting, BP Method: Large (Manual))   Pulse 71   Resp 18   Ht 6' 3" (1.905 m)   Wt 119 kg (262 lb 4.8 oz)   SpO2 98%   BMI 32.79 kg/m²    NEUROLOGICAL EXAMINATION:     MENTAL STATUS   Oriented to person, place, and time.   Level of consciousness: alert  Knowledge: good.     CRANIAL NERVES   Cranial nerves II through XII intact.     MOTOR EXAM     Strength   Strength 5/5 throughout.     GAIT AND COORDINATION     Gait  Gait: normal     Physical Exam  Vitals reviewed.   Constitutional:       Appearance: He is normal weight.   Neurological:      General: No focal deficit present.      Mental Status: He is alert and oriented to person, place, and time. Mental status is at baseline.      Cranial Nerves: Cranial nerves 2-12 are intact.      Motor: Motor strength is normal.      Gait: Gait is intact.        Assessment:     Bell's palsy  Comments:  L Jay's palsy 95% resolved   Orders:  -     Ambulatory referral/consult to Neurology    Erectile dysfunction, unspecified erectile dysfunction type         Primary Diagnosis and ICD10  Bell's palsy [G51.0]    Plan:     Patient Instructions   L sided Bell's palsy - 95% resolved   Currently doing well and looking well  F/u prn       There are no discontinued medications.    Requested Prescriptions      No prescriptions requested or ordered in this encounter       "

## 2023-07-31 ENCOUNTER — PATIENT MESSAGE (OUTPATIENT)
Dept: RESEARCH | Facility: HOSPITAL | Age: 58
End: 2023-07-31

## 2023-08-01 ENCOUNTER — TELEPHONE (OUTPATIENT)
Dept: FAMILY MEDICINE | Facility: CLINIC | Age: 58
End: 2023-08-01
Payer: COMMERCIAL

## 2023-08-01 NOTE — TELEPHONE ENCOUNTER
----- Message from LAURE Sanchez sent at 7/27/2023  8:03 AM CDT -----  Please let pt know that his ultrasound showed an epidermal inclusion cyst which is a benign finding.  We can typically monitor this to be sure it's not growing or causing any symptoms, however, if the area becomes inflamed or bothersome, he can be referred to see if it needs to be drained or removed surgically.

## 2023-08-28 ENCOUNTER — OFFICE VISIT (OUTPATIENT)
Dept: FAMILY MEDICINE | Facility: CLINIC | Age: 58
End: 2023-08-28
Payer: COMMERCIAL

## 2023-08-28 VITALS
OXYGEN SATURATION: 96 % | HEART RATE: 72 BPM | BODY MASS INDEX: 31.33 KG/M2 | DIASTOLIC BLOOD PRESSURE: 78 MMHG | TEMPERATURE: 98 F | SYSTOLIC BLOOD PRESSURE: 130 MMHG | HEIGHT: 75 IN | RESPIRATION RATE: 18 BRPM | WEIGHT: 252 LBS

## 2023-08-28 DIAGNOSIS — I10 HYPERTENSION, UNSPECIFIED TYPE: Primary | ICD-10-CM

## 2023-08-28 DIAGNOSIS — N52.9 ERECTILE DYSFUNCTION, UNSPECIFIED ERECTILE DYSFUNCTION TYPE: ICD-10-CM

## 2023-08-28 DIAGNOSIS — K21.9 GASTROESOPHAGEAL REFLUX DISEASE, UNSPECIFIED WHETHER ESOPHAGITIS PRESENT: ICD-10-CM

## 2023-08-28 PROCEDURE — 1159F PR MEDICATION LIST DOCUMENTED IN MEDICAL RECORD: ICD-10-PCS | Mod: CPTII,,, | Performed by: STUDENT IN AN ORGANIZED HEALTH CARE EDUCATION/TRAINING PROGRAM

## 2023-08-28 PROCEDURE — 3075F PR MOST RECENT SYSTOLIC BLOOD PRESS GE 130-139MM HG: ICD-10-PCS | Mod: CPTII,,, | Performed by: STUDENT IN AN ORGANIZED HEALTH CARE EDUCATION/TRAINING PROGRAM

## 2023-08-28 PROCEDURE — 3078F DIAST BP <80 MM HG: CPT | Mod: CPTII,,, | Performed by: STUDENT IN AN ORGANIZED HEALTH CARE EDUCATION/TRAINING PROGRAM

## 2023-08-28 PROCEDURE — 3008F PR BODY MASS INDEX (BMI) DOCUMENTED: ICD-10-PCS | Mod: CPTII,,, | Performed by: STUDENT IN AN ORGANIZED HEALTH CARE EDUCATION/TRAINING PROGRAM

## 2023-08-28 PROCEDURE — 99214 PR OFFICE/OUTPT VISIT, EST, LEVL IV, 30-39 MIN: ICD-10-PCS | Mod: ,,, | Performed by: STUDENT IN AN ORGANIZED HEALTH CARE EDUCATION/TRAINING PROGRAM

## 2023-08-28 PROCEDURE — 1159F MED LIST DOCD IN RCRD: CPT | Mod: CPTII,,, | Performed by: STUDENT IN AN ORGANIZED HEALTH CARE EDUCATION/TRAINING PROGRAM

## 2023-08-28 PROCEDURE — 3075F SYST BP GE 130 - 139MM HG: CPT | Mod: CPTII,,, | Performed by: STUDENT IN AN ORGANIZED HEALTH CARE EDUCATION/TRAINING PROGRAM

## 2023-08-28 PROCEDURE — 3044F HG A1C LEVEL LT 7.0%: CPT | Mod: CPTII,,, | Performed by: STUDENT IN AN ORGANIZED HEALTH CARE EDUCATION/TRAINING PROGRAM

## 2023-08-28 PROCEDURE — 3008F BODY MASS INDEX DOCD: CPT | Mod: CPTII,,, | Performed by: STUDENT IN AN ORGANIZED HEALTH CARE EDUCATION/TRAINING PROGRAM

## 2023-08-28 PROCEDURE — 3044F PR MOST RECENT HEMOGLOBIN A1C LEVEL <7.0%: ICD-10-PCS | Mod: CPTII,,, | Performed by: STUDENT IN AN ORGANIZED HEALTH CARE EDUCATION/TRAINING PROGRAM

## 2023-08-28 PROCEDURE — 99214 OFFICE O/P EST MOD 30 MIN: CPT | Mod: ,,, | Performed by: STUDENT IN AN ORGANIZED HEALTH CARE EDUCATION/TRAINING PROGRAM

## 2023-08-28 PROCEDURE — 3078F PR MOST RECENT DIASTOLIC BLOOD PRESSURE < 80 MM HG: ICD-10-PCS | Mod: CPTII,,, | Performed by: STUDENT IN AN ORGANIZED HEALTH CARE EDUCATION/TRAINING PROGRAM

## 2023-08-28 RX ORDER — TADALAFIL 5 MG/1
5 TABLET ORAL DAILY PRN
Qty: 10 TABLET | Refills: 0 | Status: SHIPPED | OUTPATIENT
Start: 2023-08-28 | End: 2024-01-25 | Stop reason: SDUPTHER

## 2023-08-28 NOTE — PATIENT INSTRUCTIONS
Continue to monitor blood pressure and keep a log, report if most of your readings are >140/90.    A normal heart rate ranges from .

## 2023-08-28 NOTE — PROGRESS NOTES
[facility]    Chief Complaint      Chief Complaint   Patient presents with    Follow-up     1m follow up on htn       History of Present Illness      Val Henry is a 58 y.o. male with chronic conditions of HLD, HTN, DMT2, Gerd, Arthritis  who presents today for HTN follow up.      Past Medical History:  Past Medical History:   Diagnosis Date    Arthritis     Bell's palsy     Diabetes mellitus, type 2     Disorder of kidney and ureter     GERD (gastroesophageal reflux disease)     Hyperlipidemia     Hypertension        Past Surgical History:   has a past surgical history that includes Shoulder surgery.    Social History:  Social History     Tobacco Use    Smoking status: Never     Passive exposure: Never    Smokeless tobacco: Never   Substance Use Topics    Alcohol use: Not Currently    Drug use: Never       I personally reviewed all past medical, surgical, and social.     Review of Systems   Constitutional:  Negative for activity change, chills and fever.   HENT:  Negative for nasal congestion, ear discharge, ear pain, postnasal drip, sinus pressure/congestion and sore throat.    Respiratory:  Negative for cough, chest tightness, shortness of breath and wheezing.    Cardiovascular:  Negative for chest pain and palpitations.   Gastrointestinal:  Negative for constipation, nausea and vomiting.   Genitourinary:  Negative for dysuria, flank pain, genital sores and urgency.   Musculoskeletal:  Negative for arthralgias and myalgias.   Neurological:  Negative for dizziness and headaches.   Psychiatric/Behavioral:  Negative for suicidal ideas.         Medications:  Outpatient Encounter Medications as of 8/28/2023   Medication Sig Dispense Refill    [DISCONTINUED] amLODIPine (NORVASC) 10 MG tablet Take 10 mg by mouth once daily.      [DISCONTINUED] hydroCHLOROthiazide (HYDRODIURIL) 12.5 MG Tab Take 1 tablet (12.5 mg total) by mouth once daily. 30 tablet 0    [DISCONTINUED] tadalafiL (CIALIS) 5 MG tablet Take 5  "mg by mouth.      amLODIPine (NORVASC) 10 MG tablet Take 1 tablet (10 mg total) by mouth once daily. 90 tablet 1    GAVILYTE-G 236-22.74-6.74 -5.86 gram suspension SMARTSIG:Milliliter(s) By Mouth      hydroCHLOROthiazide (HYDRODIURIL) 12.5 MG Tab Take 1 tablet (12.5 mg total) by mouth once daily. 90 tablet 1    tadalafiL (CIALIS) 5 MG tablet Take 1 tablet (5 mg total) by mouth daily as needed for Erectile Dysfunction. 10 tablet 0     No facility-administered encounter medications on file as of 8/28/2023.       Allergies:  Review of patient's allergies indicates:  No Known Allergies    Health Maintenance:    There is no immunization history on file for this patient.   Health Maintenance   Topic Date Due    Hepatitis C Screening  Never done    TETANUS VACCINE  Never done    Shingles Vaccine (1 of 2) Never done    Lipid Panel  07/17/2028    Colorectal Cancer Screening  05/18/2032        Physical Exam      Vital Signs  Temp: 98.1 °F (36.7 °C)  Temp Source: Oral  Pulse: 72  Resp: 18  SpO2: 96 %  BP: 130/78  BP Location: Left arm  Patient Position: Sitting  Height and Weight  Height: 6' 3" (190.5 cm)  Weight: 114.3 kg (252 lb)  BSA (Calculated - sq m): 2.46 sq meters  BMI (Calculated): 31.5  Weight in (lb) to have BMI = 25: 199.6]    Physical Exam  Vitals and nursing note reviewed.   Constitutional:       Appearance: Normal appearance.   HENT:      Head: Normocephalic and atraumatic.      Nose: Nose normal.   Eyes:      Extraocular Movements: Extraocular movements intact.      Conjunctiva/sclera: Conjunctivae normal.   Cardiovascular:      Rate and Rhythm: Normal rate and regular rhythm.      Heart sounds: Normal heart sounds.   Pulmonary:      Effort: Pulmonary effort is normal.      Breath sounds: Normal breath sounds.   Musculoskeletal:      Right lower leg: No edema.      Left lower leg: No edema.   Skin:     Findings: No rash.   Neurological:      General: No focal deficit present.      Mental Status: He is alert and " oriented to person, place, and time. Mental status is at baseline.      Cranial Nerves: No cranial nerve deficit.      Gait: Gait normal.   Psychiatric:         Mood and Affect: Mood normal.         Behavior: Behavior normal.         Thought Content: Thought content normal.         Judgment: Judgment normal.          Laboratory:  CBC:  Recent Labs   Lab 07/17/23 0922   WBC 6.01   RBC 5.65   Hemoglobin 14.9   Hematocrit 44.3   Platelet Count 290   MCV 78.4 L   MCH 26.4 L   MCHC 33.6     CMP:  Recent Labs   Lab 07/17/23 0922   Glucose 134 H   Calcium 8.8   Albumin 3.5   Total Protein 6.4   Sodium 141   Potassium 4.0   CO2 26   Chloride 109 H   BUN 15   Alk Phos 120 H   ALT 64 H   AST 29   Bilirubin, Total 0.3     LIPIDS:  Recent Labs   Lab 07/17/23 0922   HDL Cholesterol 32 L   Cholesterol 196   Triglycerides 211 H   LDL Calculated 122   Cholesterol/HDL Ratio (Risk Factor) 6.1   Non-     TSH:      A1C:  Recent Labs   Lab 07/17/23 0922   Hemoglobin A1C 6.1       Assessment/Plan     Val Henry is a 58 y.o.male with:    1. Hypertension, unspecified type  -  continue present medication, norvasc and hctz refilled    2. Gastroesophageal reflux disease, unspecified whether esophagitis present    -  gerd diet    3. Erectile dysfunction, unspecified erectile dysfunction type  -     tadalafiL (CIALIS) 5 MG tablet; Take 1 tablet (5 mg total) by mouth daily as needed for Erectile Dysfunction.  Dispense: 10 tablet; Refill: 0         Chronic conditions status updated as per HPI.  Other than changes above, cont current medications and maintain follow up with specialists.  Return to clinic in 6 months.    Patient Instructions   Continue to monitor blood pressure and keep a log, report if most of your readings are >140/90.    A normal heart rate ranges from .     Mague Palacios, FNP-BC  [facility]

## 2023-08-29 RX ORDER — HYDROCHLOROTHIAZIDE 12.5 MG/1
12.5 TABLET ORAL DAILY
Qty: 90 TABLET | Refills: 1 | Status: SHIPPED | OUTPATIENT
Start: 2023-08-29 | End: 2024-01-25 | Stop reason: SDUPTHER

## 2023-08-29 RX ORDER — AMLODIPINE BESYLATE 10 MG/1
10 TABLET ORAL DAILY
Qty: 90 TABLET | Refills: 1 | Status: SHIPPED | OUTPATIENT
Start: 2023-08-29 | End: 2024-01-25 | Stop reason: SDUPTHER

## 2023-12-08 ENCOUNTER — HOSPITAL ENCOUNTER (OUTPATIENT)
Dept: GASTROENTEROLOGY | Facility: HOSPITAL | Age: 58
Discharge: HOME | End: 2023-12-08
Attending: STUDENT IN AN ORGANIZED HEALTH CARE EDUCATION/TRAINING PROGRAM
Payer: COMMERCIAL

## 2023-12-08 VITALS
HEIGHT: 75 IN | SYSTOLIC BLOOD PRESSURE: 144 MMHG | BODY MASS INDEX: 31.71 KG/M2 | RESPIRATION RATE: 12 BRPM | HEART RATE: 66 BPM | WEIGHT: 255 LBS | DIASTOLIC BLOOD PRESSURE: 81 MMHG | OXYGEN SATURATION: 97 %

## 2023-12-08 DIAGNOSIS — Z12.11 COLON CANCER SCREENING: Primary | ICD-10-CM

## 2023-12-08 RX ORDER — SODIUM CHLORIDE 0.9 % (FLUSH) 0.9 %
10 SYRINGE (ML) INJECTION
OUTPATIENT
Start: 2023-12-08

## 2023-12-08 NOTE — H&P
Gastroenterology Pre-procedure H&P    Patient here today for colonoscopy for CRC screening. Chart reviewed, and patient previously underwent colonoscopy in [place] 5/2022 with a couple of small polyps and 5 year recall recommended. He is not due for screening and has no complaints today to warrant diagnostic exam. Colonoscopy cancelled. Will be due for next surveillance colonoscopy 5/2027.    Wale Liu MD  Gastroenterology

## 2024-01-25 ENCOUNTER — OFFICE VISIT (OUTPATIENT)
Dept: FAMILY MEDICINE | Facility: CLINIC | Age: 59
End: 2024-01-25
Payer: COMMERCIAL

## 2024-01-25 VITALS
RESPIRATION RATE: 16 BRPM | OXYGEN SATURATION: 98 % | TEMPERATURE: 98 F | DIASTOLIC BLOOD PRESSURE: 86 MMHG | WEIGHT: 255 LBS | BODY MASS INDEX: 31.71 KG/M2 | HEIGHT: 75 IN | SYSTOLIC BLOOD PRESSURE: 143 MMHG | HEART RATE: 74 BPM

## 2024-01-25 DIAGNOSIS — E78.1 HYPERTRIGLYCERIDEMIA: ICD-10-CM

## 2024-01-25 DIAGNOSIS — N52.9 ERECTILE DYSFUNCTION, UNSPECIFIED ERECTILE DYSFUNCTION TYPE: ICD-10-CM

## 2024-01-25 DIAGNOSIS — R73.03 PREDIABETES: ICD-10-CM

## 2024-01-25 DIAGNOSIS — I10 HYPERTENSION, UNSPECIFIED TYPE: Primary | ICD-10-CM

## 2024-01-25 DIAGNOSIS — Z01.00 ENCOUNTER FOR VISION SCREENING: ICD-10-CM

## 2024-01-25 DIAGNOSIS — Z86.59 HISTORY OF ADHD: ICD-10-CM

## 2024-01-25 DIAGNOSIS — Z13.29 SCREENING FOR THYROID DISORDER: ICD-10-CM

## 2024-01-25 DIAGNOSIS — E66.9 CLASS 1 OBESITY WITH BODY MASS INDEX (BMI) OF 31.0 TO 31.9 IN ADULT, UNSPECIFIED OBESITY TYPE, UNSPECIFIED WHETHER SERIOUS COMORBIDITY PRESENT: ICD-10-CM

## 2024-01-25 DIAGNOSIS — R53.83 FATIGUE, UNSPECIFIED TYPE: ICD-10-CM

## 2024-01-25 LAB
CHOLEST SERPL-MCNC: 223 MG/DL (ref 0–200)
CHOLEST/HDLC SERPL: 6.4 {RATIO}
FERRITIN SERPL-MCNC: 69 NG/ML (ref 26–388)
HDLC SERPL-MCNC: 35 MG/DL (ref 40–60)
IMM RETICS NFR: 8.4 % (ref 2.3–13.4)
IRON SATN MFR SERPL: 19 % (ref 14–50)
IRON SERPL-MCNC: 70 ΜG/DL (ref 65–175)
LDLC SERPL CALC-MCNC: 164 MG/DL
LDLC/HDLC SERPL: 4.7 {RATIO}
NONHDLC SERPL-MCNC: 188 MG/DL
RETICS # AUTO: 0.09 X10E6/UL (ref 0.02–0.11)
RETICS/RBC NFR AUTO: 1.5 % (ref 0.4–2.2)
TIBC SERPL-MCNC: 360 ΜG/DL (ref 250–450)
TRIGL SERPL-MCNC: 118 MG/DL (ref 35–150)
TSH SERPL DL<=0.005 MIU/L-ACNC: 0.55 UIU/ML (ref 0.36–3.74)
VIT B12 SERPL-MCNC: 384 PG/ML (ref 193–986)
VLDLC SERPL-MCNC: 24 MG/DL

## 2024-01-25 PROCEDURE — 3008F BODY MASS INDEX DOCD: CPT | Mod: CPTII,,, | Performed by: STUDENT IN AN ORGANIZED HEALTH CARE EDUCATION/TRAINING PROGRAM

## 2024-01-25 PROCEDURE — 80061 LIPID PANEL: CPT | Mod: ,,, | Performed by: CLINICAL MEDICAL LABORATORY

## 2024-01-25 PROCEDURE — 3079F DIAST BP 80-89 MM HG: CPT | Mod: CPTII,,, | Performed by: STUDENT IN AN ORGANIZED HEALTH CARE EDUCATION/TRAINING PROGRAM

## 2024-01-25 PROCEDURE — 83540 ASSAY OF IRON: CPT | Mod: GZ,,, | Performed by: CLINICAL MEDICAL LABORATORY

## 2024-01-25 PROCEDURE — 82728 ASSAY OF FERRITIN: CPT | Mod: GZ,,, | Performed by: CLINICAL MEDICAL LABORATORY

## 2024-01-25 PROCEDURE — 3044F HG A1C LEVEL LT 7.0%: CPT | Mod: CPTII,,, | Performed by: STUDENT IN AN ORGANIZED HEALTH CARE EDUCATION/TRAINING PROGRAM

## 2024-01-25 PROCEDURE — 85045 AUTOMATED RETICULOCYTE COUNT: CPT | Mod: ,,, | Performed by: CLINICAL MEDICAL LABORATORY

## 2024-01-25 PROCEDURE — 84443 ASSAY THYROID STIM HORMONE: CPT | Mod: ,,, | Performed by: CLINICAL MEDICAL LABORATORY

## 2024-01-25 PROCEDURE — 82607 VITAMIN B-12: CPT | Mod: ,,, | Performed by: CLINICAL MEDICAL LABORATORY

## 2024-01-25 PROCEDURE — 3077F SYST BP >= 140 MM HG: CPT | Mod: CPTII,,, | Performed by: STUDENT IN AN ORGANIZED HEALTH CARE EDUCATION/TRAINING PROGRAM

## 2024-01-25 PROCEDURE — 99214 OFFICE O/P EST MOD 30 MIN: CPT | Mod: ,,, | Performed by: STUDENT IN AN ORGANIZED HEALTH CARE EDUCATION/TRAINING PROGRAM

## 2024-01-25 PROCEDURE — 83550 IRON BINDING TEST: CPT | Mod: GZ,,, | Performed by: CLINICAL MEDICAL LABORATORY

## 2024-01-25 PROCEDURE — 83036 HEMOGLOBIN GLYCOSYLATED A1C: CPT | Mod: ,,, | Performed by: CLINICAL MEDICAL LABORATORY

## 2024-01-25 PROCEDURE — 1159F MED LIST DOCD IN RCRD: CPT | Mod: CPTII,,, | Performed by: STUDENT IN AN ORGANIZED HEALTH CARE EDUCATION/TRAINING PROGRAM

## 2024-01-25 RX ORDER — HYDROCHLOROTHIAZIDE 12.5 MG/1
12.5 TABLET ORAL DAILY
Qty: 90 TABLET | Refills: 1 | Status: SHIPPED | OUTPATIENT
Start: 2024-01-25 | End: 2024-07-23

## 2024-01-25 RX ORDER — AMLODIPINE BESYLATE 10 MG/1
10 TABLET ORAL DAILY
Qty: 90 TABLET | Refills: 1 | Status: SHIPPED | OUTPATIENT
Start: 2024-01-25 | End: 2024-07-23

## 2024-01-25 RX ORDER — TADALAFIL 5 MG/1
5 TABLET ORAL DAILY PRN
Qty: 10 TABLET | Refills: 0 | Status: SHIPPED | OUTPATIENT
Start: 2024-01-25

## 2024-01-25 RX ORDER — DEXTROAMPHETAMINE SACCHARATE, AMPHETAMINE ASPARTATE MONOHYDRATE, DEXTROAMPHETAMINE SULFATE AND AMPHETAMINE SULFATE 5; 5; 5; 5 MG/1; MG/1; MG/1; MG/1
20 CAPSULE, EXTENDED RELEASE ORAL EVERY MORNING
COMMUNITY

## 2024-01-25 NOTE — PATIENT INSTRUCTIONS
We have placed a referral for you, if you do not hear from them or receive an appointment in the next 1-2 weeks, please contact the referral center at [phone].

## 2024-01-26 LAB
EST. AVERAGE GLUCOSE BLD GHB EST-MCNC: 134 MG/DL
HBA1C MFR BLD HPLC: 6.3 % (ref 4.5–6.6)

## 2024-01-28 DIAGNOSIS — E78.5 HYPERLIPIDEMIA, UNSPECIFIED HYPERLIPIDEMIA TYPE: Primary | ICD-10-CM

## 2024-01-28 RX ORDER — ATORVASTATIN CALCIUM 10 MG/1
10 TABLET, FILM COATED ORAL DAILY
Qty: 90 TABLET | Refills: 1 | Status: SHIPPED | OUTPATIENT
Start: 2024-01-28 | End: 2025-01-27

## 2024-01-28 NOTE — PROGRESS NOTES
[facility]    Chief Complaint      Chief Complaint   Patient presents with    Low Energy     States been having low energy for years now.       History of Present Illness      Val Henry is a 58 y.o. male with chronic conditions of htn, obesity, ED, prediabetes, gerd, ED  who presents today for low energy. Wants to have testosterone levels checked, would like referral to urology.         Past Medical History:  Past Medical History:   Diagnosis Date    Arthritis     Bell's palsy     Diabetes mellitus, type 2     Disorder of kidney and ureter     GERD (gastroesophageal reflux disease)     Hyperlipidemia     Hypertension        Past Surgical History:   has a past surgical history that includes Shoulder surgery.    Social History:  Social History     Tobacco Use    Smoking status: Never     Passive exposure: Never    Smokeless tobacco: Never   Substance Use Topics    Alcohol use: Not Currently    Drug use: Never       I personally reviewed all past medical, surgical, and social.     Review of Systems     Medications:  Outpatient Encounter Medications as of 1/25/2024   Medication Sig Dispense Refill    dextroamphetamine-amphetamine (ADDERALL XR) 20 MG 24 hr capsule Take 20 mg by mouth every morning. Take 1 capsule PO 3 times a week      GAVILYTE-G 236-22.74-6.74 -5.86 gram suspension SMARTSIG:Milliliter(s) By Mouth      [DISCONTINUED] amLODIPine (NORVASC) 10 MG tablet Take 1 tablet (10 mg total) by mouth once daily. 90 tablet 1    [DISCONTINUED] hydroCHLOROthiazide (HYDRODIURIL) 12.5 MG Tab Take 1 tablet (12.5 mg total) by mouth once daily. 90 tablet 1    [DISCONTINUED] tadalafiL (CIALIS) 5 MG tablet Take 1 tablet (5 mg total) by mouth daily as needed for Erectile Dysfunction. 10 tablet 0    amLODIPine (NORVASC) 10 MG tablet Take 1 tablet (10 mg total) by mouth once daily. 90 tablet 1    hydroCHLOROthiazide (HYDRODIURIL) 12.5 MG Tab Take 1 tablet (12.5 mg total) by mouth once daily. 90 tablet 1    tadalafiL  "(CIALIS) 5 MG tablet Take 1 tablet (5 mg total) by mouth daily as needed for Erectile Dysfunction. 10 tablet 0     No facility-administered encounter medications on file as of 1/25/2024.       Allergies:  Review of patient's allergies indicates:  No Known Allergies    Health Maintenance:    There is no immunization history on file for this patient.   Health Maintenance   Topic Date Due    Hepatitis C Screening  Never done    TETANUS VACCINE  Never done    Shingles Vaccine (1 of 2) Never done    Lipid Panel  01/25/2029    Colorectal Cancer Screening  05/18/2032        Physical Exam      Vital Signs  Temp: 98.3 °F (36.8 °C)  Temp Source: Oral  Pulse: 74  Resp: 16  SpO2: 98 %  BP: (!) 143/86  BP Location: Right arm  Patient Position: Sitting  Height and Weight  Height: 6' 3" (190.5 cm)  Weight: 115.7 kg (255 lb)  BSA (Calculated - sq m): 2.47 sq meters  BMI (Calculated): 31.9  Weight in (lb) to have BMI = 25: 199.6]    Physical Exam  Vitals and nursing note reviewed.   Constitutional:       Appearance: Normal appearance.   HENT:      Head: Normocephalic and atraumatic.      Nose: Nose normal.   Eyes:      Conjunctiva/sclera: Conjunctivae normal.   Cardiovascular:      Rate and Rhythm: Normal rate and regular rhythm.   Pulmonary:      Effort: Pulmonary effort is normal.      Breath sounds: Normal breath sounds.   Musculoskeletal:      Right lower leg: No edema.      Left lower leg: No edema.   Skin:     Findings: No rash.   Neurological:      General: No focal deficit present.      Mental Status: He is alert and oriented to person, place, and time. Mental status is at baseline.   Psychiatric:         Mood and Affect: Mood normal.         Behavior: Behavior normal.         Thought Content: Thought content normal.         Judgment: Judgment normal.          Laboratory:  CBC:  Recent Labs   Lab 07/17/23  0922   WBC 6.01   RBC 5.65   Hemoglobin 14.9   Hematocrit 44.3   Platelet Count 290   MCV 78.4 L   MCH 26.4 L   MCHC " 33.6     CMP:  Recent Labs   Lab 07/17/23  0922   Glucose 134 H   Calcium 8.8   Albumin 3.5   Total Protein 6.4   Sodium 141   Potassium 4.0   CO2 26   Chloride 109 H   BUN 15   Alk Phos 120 H   ALT 64 H   AST 29   Bilirubin, Total 0.3     LIPIDS:  Recent Labs   Lab 07/17/23  0922 01/25/24  1136   TSH  --  0.554   HDL Cholesterol 32 L 35 L   Cholesterol 196 223 H   Triglycerides 211 H 118   LDL Calculated 122 164   Cholesterol/HDL Ratio (Risk Factor) 6.1 6.4   Non- 188     TSH:  Recent Labs   Lab 01/25/24  1136   TSH 0.554     A1C:  Recent Labs   Lab 01/31/22  1100 07/17/23  0922 01/25/24  1136   Hemoglobin A1C 6.2 6.1 6.3       Assessment/Plan     Val Henry is a 58 y.o.male with:    1. Hypertension, unspecified type  -     amLODIPine (NORVASC) 10 MG tablet; Take 1 tablet (10 mg total) by mouth once daily.  Dispense: 90 tablet; Refill: 1  -     hydroCHLOROthiazide (HYDRODIURIL) 12.5 MG Tab; Take 1 tablet (12.5 mg total) by mouth once daily.  Dispense: 90 tablet; Refill: 1    2. Erectile dysfunction, unspecified erectile dysfunction type  -     tadalafiL (CIALIS) 5 MG tablet; Take 1 tablet (5 mg total) by mouth daily as needed for Erectile Dysfunction.  Dispense: 10 tablet; Refill: 0  -     Ambulatory referral/consult to Urology; Future; Expected date: 02/01/2024    3. Prediabetes  -     Hemoglobin A1C; Future; Expected date: 01/25/2024    4. Class 1 obesity with body mass index (BMI) of 31.0 to 31.9 in adult, unspecified obesity type, unspecified whether serious comorbidity present  -     TSH; Future; Expected date: 01/25/2024    5. Hypertriglyceridemia  -     Lipid Panel; Future    6. Encounter for vision screening  -     Ambulatory referral/consult to Optometry; Future; Expected date: 02/01/2024    7. Fatigue, unspecified type  -     TSH; Future; Expected date: 01/25/2024  -     Ferritin  -     Iron and TIBC  -     Vitamin B12  -     Reticulocytes    8. Screening for thyroid disorder  -     TSH;  Future; Expected date: 01/25/2024    9. History of ADHD  -     Ambulatory referral/consult to Psychiatry; Future; Expected date: 02/04/2024         Chronic conditions status updated as per HPI.  Other than changes above, cont current medications and maintain follow up with specialists.  Return to clinic in 6 months routine follow up, 1 month htn.    Patient Instructions   We have placed a referral for you, if you do not hear from them or receive an appointment in the next 1-2 weeks, please contact the referral center at [phone].      Mague Palacios, [facility]

## 2024-01-29 ENCOUNTER — TELEPHONE (OUTPATIENT)
Dept: FAMILY MEDICINE | Facility: CLINIC | Age: 59
End: 2024-01-29
Payer: COMMERCIAL

## 2024-01-29 NOTE — TELEPHONE ENCOUNTER
----- Message from LAURE Sanchez sent at 1/28/2024 10:06 AM CST -----  Please let pt know labs were ok except for cholesterol which was elevated. Adding a statin, repeat this lab in 6 weeks.

## 2024-03-27 PROBLEM — E11.9 DIET-CONTROLLED DIABETES MELLITUS: Status: ACTIVE | Noted: 2022-03-09

## 2024-03-27 PROBLEM — E78.2 MIXED HYPERLIPIDEMIA: Status: ACTIVE | Noted: 2022-01-31

## 2024-03-27 PROBLEM — K21.9 GASTROESOPHAGEAL REFLUX DISEASE WITHOUT ESOPHAGITIS: Status: ACTIVE | Noted: 2022-01-31
